# Patient Record
Sex: FEMALE | Race: WHITE | NOT HISPANIC OR LATINO | Employment: UNEMPLOYED | URBAN - METROPOLITAN AREA
[De-identification: names, ages, dates, MRNs, and addresses within clinical notes are randomized per-mention and may not be internally consistent; named-entity substitution may affect disease eponyms.]

---

## 2017-05-15 ENCOUNTER — HOSPITAL ENCOUNTER (OUTPATIENT)
Dept: FAMILY MEDICINE CLINIC | Facility: CLINIC | Age: 32
Setting detail: SPECIMEN
Discharge: HOME | End: 2017-05-15
Attending: NURSE PRACTITIONER | Admitting: NURSE PRACTITIONER

## 2017-05-15 LAB
ANION GAP SERPL CALC-SCNC: 14.2 MMOL/L (ref 10–20)
BUN SERPL-MCNC: 8 MG/DL (ref 8–20)
BUN/CREAT SERPL: 10 (ref 5.4–26.2)
CALCIUM SERPL-MCNC: 9.4 MG/DL (ref 8.9–10.3)
CHLORIDE SERPL-SCNC: 99 MMOL/L (ref 101–111)
CHOLEST SERPL-MCNC: 166 MG/DL
CHOLEST/HDLC SERPL: 3.3 {RATIO}
CONV CO2: 28 MMOL/L (ref 22–32)
CONV LDL CHOLESTEROL DIRECT: 98 MG/DL (ref 0–100)
CREAT UR-MCNC: 0.8 MG/DL (ref 0.4–1)
GLUCOSE SERPL-MCNC: 99 MG/DL (ref 65–99)
HDLC SERPL-MCNC: 51 MG/DL
LDLC/HDLC SERPL: 1.9 {RATIO}
LIPID INTERPRETATION: NORMAL
POTASSIUM SERPL-SCNC: 4.2 MMOL/L (ref 3.6–5.1)
SODIUM SERPL-SCNC: 137 MMOL/L (ref 136–144)
TRIGL SERPL-MCNC: 45 MG/DL
VLDLC SERPL CALC-MCNC: 17.2 MG/DL

## 2019-06-17 ENCOUNTER — TELEPHONE (OUTPATIENT)
Dept: PSYCHIATRY | Facility: CLINIC | Age: 34
End: 2019-06-17

## 2019-07-09 ENCOUNTER — OFFICE VISIT (OUTPATIENT)
Dept: PSYCHIATRY | Facility: CLINIC | Age: 34
End: 2019-07-09

## 2019-07-09 DIAGNOSIS — F98.8 ATTENTION DEFICIT DISORDER (ADD) WITHOUT HYPERACTIVITY: ICD-10-CM

## 2019-07-09 DIAGNOSIS — F41.1 GENERALIZED ANXIETY DISORDER: Primary | ICD-10-CM

## 2019-07-09 PROBLEM — F99 PSYCHIATRIC DISORDER: Status: ACTIVE | Noted: 2017-05-15

## 2019-07-09 PROBLEM — N89.8 VAGINAL DISCHARGE: Status: ACTIVE | Noted: 2017-06-27

## 2019-07-09 PROBLEM — E22.1 HYPERPROLACTINEMIA (HCC): Status: ACTIVE | Noted: 2017-05-15

## 2019-07-09 PROBLEM — Z13.9 ENCOUNTER FOR SCREENING: Status: ACTIVE | Noted: 2017-05-15

## 2019-07-09 PROCEDURE — 99213 OFFICE O/P EST LOW 20 MIN: CPT | Performed by: PSYCHIATRY & NEUROLOGY

## 2019-07-09 RX ORDER — DEXTROAMPHETAMINE SACCHARATE, AMPHETAMINE ASPARTATE, DEXTROAMPHETAMINE SULFATE AND AMPHETAMINE SULFATE 7.5; 7.5; 7.5; 7.5 MG/1; MG/1; MG/1; MG/1
TABLET ORAL
Qty: 75 TABLET | Refills: 0 | Status: SHIPPED | OUTPATIENT
Start: 2019-08-17 | End: 2019-07-18 | Stop reason: SDUPTHER

## 2019-07-09 RX ORDER — METRONIDAZOLE 500 MG/1
TABLET ORAL
COMMUNITY
Start: 2017-06-27 | End: 2019-10-09 | Stop reason: SDUPTHER

## 2019-07-09 RX ORDER — DEXTROAMPHETAMINE SACCHARATE, AMPHETAMINE ASPARTATE, DEXTROAMPHETAMINE SULFATE AND AMPHETAMINE SULFATE 7.5; 7.5; 7.5; 7.5 MG/1; MG/1; MG/1; MG/1
TABLET ORAL
Qty: 75 TABLET | Refills: 0 | Status: SHIPPED | OUTPATIENT
Start: 2019-07-18 | End: 2019-07-09 | Stop reason: SDUPTHER

## 2019-07-09 RX ORDER — DEXTROAMPHETAMINE SACCHARATE, AMPHETAMINE ASPARTATE, DEXTROAMPHETAMINE SULFATE AND AMPHETAMINE SULFATE 5; 5; 5; 5 MG/1; MG/1; MG/1; MG/1
1 TABLET ORAL 3 TIMES DAILY
Refills: 0 | COMMUNITY
Start: 2019-04-01 | End: 2019-07-09

## 2019-07-09 NOTE — PROGRESS NOTES
Subjective   Marlene Granda is a 33 y.o. female who presents today for follow up    Chief Complaint:  Decreased concentration     History of Present Illness: long hx of adhd   mood is stable overall, concentration is good on meds   she is able to accompish more , better self esteem, more organied, does not get distracted but it is getting less potent and does nto last long   the pt is in nursing school , requires memorization   denied AVH/SI/Hi  denied feeling anxious without a reason     Precipitating and Ameliorating Factors: the pt is in school       Previous Psychiatric Diagnoses   Axis I: Affective/Bipolar Disorder, Anxiety/Panic Disorder  Axis II: Borderline Personality     Past Hospitalizations or Residential Treatment   Locations\Providers: no SAs      Past Outpatient Treatment   Diagnosis Treated: Affective Disorder, Anxiety/Panic Dis.  Treatment Type: Medication Management  Location: [facility] in the past      Prior Psychiatric Medications   Comments: prozac for 6 years   wellbutrin for 4 years , stopped in Jan 2017  paxil not effective and weight gain   Buspar - not effective   strattera - not effective      Consequences of Mental Disorder   Consequences: emotional distress     SOCIAL HISTORY   Single  Number of children: 1  Current Relationship is: unstable  Family of Origin is: unstable, abusive, distant  Comments: Single  children-1  occupation-/- Student Neuro      Current Living Situation   Lives with: children     Education   Level: some college     Employment   Job Status: student     Hobbies and Leisure Activities   Activity Type: quiet activities     Alcohol use   Freq. drinking: social  Smoking History   Smoking Hx: Former smoker     Exercise   Exercise sessions (per wk): 4     Illicit Drug Use   Illicit Drugs used: none     FAMILY HISTORY OF MENTAL DISORDERS   fh Grandparents: Non-contributory  fh Mother: Affective Disorders  fh Father: Non-contributory  fh Siblings:  Non-contributory  fh Other: Non-contributory     The following portions of the patient's history were reviewed and updated as appropriate: allergies, current medications, past family history, past medical history, past social history, past surgical history and problem list.            Interval History  No Change    Side Effects  Denied       Past Medical History:  Past Medical History:   Diagnosis Date   • ADD (attention deficit disorder)    • Anxiety    • Borderline personality disorder (CMS/HCC)    • Depression    • Non-artifactual high serum prolactin     Abstraction from [facility] Prolactin Levels   • Substance abuse (CMS/HCC)      Past Surgical History:  History reviewed. No pertinent surgical history.    Problem List:  Patient Active Problem List   Diagnosis   • Attention deficit disorder (ADD) without hyperactivity   • Encounter for screening   • Generalized anxiety disorder   • Hyperprolactinemia (CMS/HCC)   • Psychiatric disorder   • Vaginal discharge       Allergy:   No Known Allergies     Discontinued Medications:  Medications Discontinued During This Encounter   Medication Reason   • amphetamine-dextroamphetamine (ADDERALL) 20 MG tablet    • amphetamine-dextroamphetamine (ADDERALL) 30 MG tablet Reorder       Current Medications:   Current Outpatient Medications   Medication Sig Dispense Refill   • [START ON 8/17/2019] amphetamine-dextroamphetamine (ADDERALL) 30 MG tablet 1tab po at 8 AM and noon and 1/2 tab at 4 PM 75 tablet 0   • metroNIDAZOLE (FLAGYL) 500 MG tablet METRONIDAZOLE 500 MG TABS       No current facility-administered medications for this visit.          Review of Symptoms:    Psychiatric/Behavioral: Negative for agitation, behavioral problems, confusion, decreased concentration, dysphoric mood, hallucinations, self-injury, sleep disturbance and suicidal ideas. The patient is not nervous/anxious and is not hyperactive.        Physical Exam:   There were no vitals taken for this  visit.    Mental Status Exam:   General Appearance:   good hygiene, apperas stated age   Behavior: good eye contact, normal motor activity   Attitude/Cooperation:   cooperative and appropriately verbal, patient is pleasant on approach   Speech  : coherent thoughts-organized and easy to follow, normal rate, normal flow and prosody   Thought Processes:   goal directed and associations logical   Thought Content/Perceptions A: WNL   The patient also appears to have  no depersonalization or derealization   Thought Content/Perceptions B:   WNL   The patient also appears to have no obsessions or compulsions or phobias    Risk Assessment:     Suicidality:   not present   Homicidality:   not present   Affect:   labile   Mood:   depressed, anxious, fluctuates   Insight/Judgement:   intact insight and judgement   Cognitive Functioning and Sensorium:   oriented to person and place and time, memory WNL   Intellect:   estimated (based on interview)   Fund of Knowledge:   adequate   Significant Personality Traits:   deferred; longitudinal view needed      PHQ-9 Score:  PHQ-9 Score: 7      Current some day smoker 3-10 mintues spent counseling Has reduced [place] use    I advised Marlene of the risks of tobacco use.     Lab Results:   No visits with results within 3 Month(s) from this visit.   Latest known visit with results is:   Hospital Outpatient Visit on 05/15/2017   Component Date Value Ref Range Status   • Total Cholesterol 05/15/2017 166  <200 mg/dL Final   • Triglycerides 05/15/2017 45  <150 mg/dL Final   • HDL Cholesterol 05/15/2017 51  >39 mg/dL Final   • LDL Cholesterol  05/15/2017 98  0 - 100 mg/dL Final   • VLDL Cholesterol 05/15/2017 17.2  <21 mg/dL Final   • LDL/HDL Ratio 05/15/2017 1.9   Final   • Chol/HDL Ratio 05/15/2017 3.3   Final    (SEE BELOW)  The following guidelines have been recommended by the NCEP for -    • Lipid Interpretation 05/15/2017 Total Cholesterol, Total Triglycerides, LDL Cholesterol,and HDL  Cholesterol:    Final   • Lipid Interpretation 05/15/2017 TOTAL CHOLESTEROL                    HDL CHOLESTEROL  Desirable:              Final   • Lipid Interpretation 05/15/2017 <200 mg/dL     Low HDL:            <40 mg/dL  Borderline High:   200-239 mg/dL    Final   • Lipid Interpretation 05/15/2017    Normal:           40-60 mg/dL  High:           > or = 240 mg/dL       Final   • Lipid Interpretation 05/15/2017 Desirable:          >60 mg/dL  TOTAL TRIGLYCERIDE                   LDL   Final   • Lipid Interpretation 05/15/2017 CHOLESTEROL  Normal:               <150 mg/dL     Optimal:           <100 mg/dL   Final   • Lipid Interpretation 05/15/2017  Borderline High:   150-199 mg/dL     Low Risk:       100-129 mg/dL  High:        Final   • Lipid Interpretation 05/15/2017         200-499 mg/dL     Borderline High:130-159 mg/dL  Very High:      > or =   Final   • Lipid Interpretation 05/15/2017 500 mg/dL     High:           160-189 mg/dL                                       Final   • Lipid Interpretation 05/15/2017   Very High:   > or = 190 mg/dL  The following ratios of LDL to HDL and Total   Final   • Lipid Interpretation 05/15/2017 Cholesterol to HDL are for information only:  LDL/HDL RATIO                       Final   • Lipid Interpretation 05/15/2017    TOTAL CHOLESTEROL/HDL RATIO  Desirable:              <5           Low Risk:    Final   • Lipid Interpretation 05/15/2017      3.3-4.4   Optimal:        < or = 3.5           Average Risk:   4.4-7.1       Final   • Lipid Interpretation 05/15/2017                                    Medium Risk:    7.1-11                         Final   • Lipid Interpretation 05/15/2017                   High Risk:         >11      Final   • Sodium 05/15/2017 137  136 - 144 mmol/L Final   • Potassium 05/15/2017 4.2  3.6 - 5.1 mmol/L Final   • Chloride 05/15/2017 99* 101 - 111 mmol/L Final   • CO2 05/15/2017 28  22 - 32 mmol/L Final   • Glucose 05/15/2017 99  65 - 99 mg/dL Final   •  BUN 05/15/2017 8  8 - 20 mg/dL Final   • Creatinine 05/15/2017 0.8  0.4 - 1.0 mg/dl Final   • Anion Gap 05/15/2017 14.2  10 - 20 Final   • BUN/Creatinine Ratio 05/15/2017 10.0  5.4 - 26.2 Final   • GFR MDRD Non  05/15/2017 >60  >60 mL/min/1.73m2 Final   • GFR MDRD  05/15/2017 >60  >60 mL/min/1.73m2 Final   • Calcium 05/15/2017 9.4  8.9 - 10.3 mg/dL Final       Assessment/Plan   Problems Addressed this Visit        Other    Attention deficit disorder (ADD) without hyperactivity    Relevant Medications    amphetamine-dextroamphetamine (ADDERALL) 30 MG tablet (Start on 8/17/2019)    Generalized anxiety disorder - Primary    Relevant Medications    amphetamine-dextroamphetamine (ADDERALL) 30 MG tablet (Start on 8/17/2019)          Visit Diagnoses:    ICD-10-CM ICD-9-CM   1. Generalized anxiety disorder F41.1 300.02   2. Attention deficit disorder (ADD) without hyperactivity F98.8 314.00       TREATMENT PLAN/GOALS: Continue supportive psychotherapy efforts and medications as indicated. Treatment and medication options discussed during today's visit. Patient ackowledged and verbally consented to continue with current treatment plan and was educated on the importance of compliance with treatment and follow-up appointments.    MEDICATION ISSUES:  Cont adderall 30 mg , total 75 mg/day   Anxiety - basic CBT , thought - emotions connection discussed   INSPECT reviewed as expected  Discussed medication options and treatment plan of prescribed medication as well as the risks, benefits, and side effects including potential falls, possible impaired driving and metabolic adversities among others. Patient is agreeable to call the office with any worsening of symptoms or onset of side effects. Patient is agreeable to call 911 or go to the nearest ER should he/she begin having SI/HI. No medication side effects or related complaints today.     MEDS ORDERED DURING VISIT:  New Medications Ordered This  Visit   Medications   • amphetamine-dextroamphetamine (ADDERALL) 30 MG tablet     Sitab po at 8 AM and noon and 1/2 tab at 4 PM     Dispense:  75 tablet     Refill:  0       Return in about 3 months (around 10/9/2019).         This document has been electronically signed by Liliam Casarez MD  2019 2:22 PM

## 2019-07-09 NOTE — PATIENT INSTRUCTIONS
Generalized Anxiety Disorder, Adult  Generalized anxiety disorder (SHARDA) is a mental health disorder. People with this condition constantly worry about everyday events. Unlike normal anxiety, worry related to SHARDA is not triggered by a specific event. These worries also do not fade or get better with time. SHARDA interferes with life functions, including relationships, work, and school.  SHARDA can vary from mild to severe. People with severe SHARDA can have intense waves of anxiety with physical symptoms (panic attacks).  What are the causes?  The exact cause of SHARDA is not known.  What increases the risk?  This condition is more likely to develop in:  · Women.  · People who have a family history of anxiety disorders.  · People who are very shy.  · People who experience very stressful life events, such as the death of a loved one.  · People who have a very stressful family environment.    What are the signs or symptoms?  People with SHARDA often worry excessively about many things in their lives, such as their health and family. They may also be overly concerned about:  · Doing well at work.  · Being on time.  · Natural disasters.  · Friendships.    Physical symptoms of SHARDA include:  · Fatigue.  · Muscle tension or having muscle twitches.  · Trembling or feeling shaky.  · Being easily startled.  · Feeling like your heart is pounding or racing.  · Feeling out of breath or like you cannot take a deep breath.  · Having trouble falling asleep or staying asleep.  · Sweating.  · Nausea, diarrhea, or irritable bowel syndrome (IBS).  · Headaches.  · Trouble concentrating or remembering facts.  · Restlessness.  · Irritability.    How is this diagnosed?  Your health care provider can diagnose SHARDA based on your symptoms and medical history. You will also have a physical exam. The health care provider will ask specific questions about your symptoms, including how severe they are, when they started, and if they come and go. Your health care  provider may ask you about your use of alcohol or drugs, including prescription medicines. Your health care provider may refer you to a mental health specialist for further evaluation.  Your health care provider will do a thorough examination and may perform additional tests to rule out other possible causes of your symptoms.  To be diagnosed with SHARDA, a person must have anxiety that:  · Is out of his or her control.  · Affects several different aspects of his or her life, such as work and relationships.  · Causes distress that makes him or her unable to take part in normal activities.  · Includes at least three physical symptoms of SHARDA, such as restlessness, fatigue, trouble concentrating, irritability, muscle tension, or sleep problems.    Before your health care provider can confirm a diagnosis of SHARDA, these symptoms must be present more days than they are not, and they must last for six months or longer.  How is this treated?  The following therapies are usually used to treat SHARDA:  · Medicine. Antidepressant medicine is usually prescribed for long-term daily control. Antianxiety medicines may be added in severe cases, especially when panic attacks occur.  · Talk therapy (psychotherapy). Certain types of talk therapy can be helpful in treating SHARDA by providing support, education, and guidance. Options include:  ? Cognitive behavioral therapy (CBT). People learn coping skills and techniques to ease their anxiety. They learn to identify unrealistic or negative thoughts and behaviors and to replace them with positive ones.  ? Acceptance and commitment therapy (ACT). This treatment teaches people how to be mindful as a way to cope with unwanted thoughts and feelings.  ? Biofeedback. This process trains you to manage your body's response (physiological response) through breathing techniques and relaxation methods. You will work with a therapist while machines are used to monitor your physical symptoms.  · Stress  management techniques. These include yoga, meditation, and exercise.    A mental health specialist can help determine which treatment is best for you. Some people see improvement with one type of therapy. However, other people require a combination of therapies.  Follow these instructions at home:  · Take over-the-counter and prescription medicines only as told by your health care provider.  · Try to maintain a normal routine.  · Try to anticipate stressful situations and allow extra time to manage them.  · Practice any stress management or self-calming techniques as taught by your health care provider.  · Do not punish yourself for setbacks or for not making progress.  · Try to recognize your accomplishments, even if they are small.  · Keep all follow-up visits as told by your health care provider. This is important.  Contact a health care provider if:  · Your symptoms do not get better.  · Your symptoms get worse.  · You have signs of depression, such as:  ? A persistently sad, cranky, or irritable mood.  ? Loss of enjoyment in activities that used to bring you collins.  ? Change in weight or eating.  ? Changes in sleeping habits.  ? Avoiding friends or family members.  ? Loss of energy for normal tasks.  ? Feelings of guilt or worthlessness.  Get help right away if:  · You have serious thoughts about hurting yourself or others.  If you ever feel like you may hurt yourself or others, or have thoughts about taking your own life, get help right away. You can go to your nearest emergency department or call:  · Your local emergency services (911 in the [place]  · A suicide crisis helpline, such as the National Suicide Prevention Lifeline at 1-927.439.7716. This is open 24 hours a day.    Summary  · Generalized anxiety disorder (SHARDA) is a mental health disorder that involves worry that is not triggered by a specific event.  · People with SHARDA often worry excessively about many things in their lives, such as their health and  family.  · SHARDA may cause physical symptoms such as restlessness, trouble concentrating, sleep problems, frequent sweating, nausea, diarrhea, headaches, and trembling or muscle twitching.  · A mental health specialist can help determine which treatment is best for you. Some people see improvement with one type of therapy. However, other people require a combination of therapies.  This information is not intended to replace advice given to you by your health care provider. Make sure you discuss any questions you have with your health care provider.  Document Released: 04/14/2014 Document Revised: 11/07/2017 Document Reviewed: 11/07/2017  [employer] Interactive Patient Education © 2019 [employer]

## 2019-07-18 DIAGNOSIS — F98.8 ATTENTION DEFICIT DISORDER (ADD) WITHOUT HYPERACTIVITY: ICD-10-CM

## 2019-07-18 RX ORDER — DEXTROAMPHETAMINE SACCHARATE, AMPHETAMINE ASPARTATE, DEXTROAMPHETAMINE SULFATE AND AMPHETAMINE SULFATE 7.5; 7.5; 7.5; 7.5 MG/1; MG/1; MG/1; MG/1
TABLET ORAL
Qty: 75 TABLET | Refills: 0 | Status: SHIPPED | OUTPATIENT
Start: 2019-07-17 | End: 2019-10-09 | Stop reason: SDUPTHER

## 2019-10-09 ENCOUNTER — OFFICE VISIT (OUTPATIENT)
Dept: PSYCHIATRY | Facility: CLINIC | Age: 34
End: 2019-10-09

## 2019-10-09 DIAGNOSIS — F98.8 ATTENTION DEFICIT DISORDER (ADD) WITHOUT HYPERACTIVITY: Primary | ICD-10-CM

## 2019-10-09 DIAGNOSIS — F41.1 GENERALIZED ANXIETY DISORDER: ICD-10-CM

## 2019-10-09 DIAGNOSIS — F17.200 TOBACCO DEPENDENCE: ICD-10-CM

## 2019-10-09 PROCEDURE — 99213 OFFICE O/P EST LOW 20 MIN: CPT | Performed by: PSYCHIATRY & NEUROLOGY

## 2019-10-09 RX ORDER — DEXTROAMPHETAMINE SACCHARATE, AMPHETAMINE ASPARTATE, DEXTROAMPHETAMINE SULFATE AND AMPHETAMINE SULFATE 7.5; 7.5; 7.5; 7.5 MG/1; MG/1; MG/1; MG/1
TABLET ORAL
Qty: 75 TABLET | Refills: 0 | Status: SHIPPED | OUTPATIENT
Start: 2019-10-09 | End: 2019-10-09 | Stop reason: SDUPTHER

## 2019-10-09 RX ORDER — DIPHENOXYLATE HYDROCHLORIDE AND ATROPINE SULFATE 2.5; .025 MG/1; MG/1
1 TABLET ORAL DAILY
Qty: 30 TABLET | Refills: 2 | Status: SHIPPED | OUTPATIENT
Start: 2019-10-09 | End: 2020-10-18 | Stop reason: SDUPTHER

## 2019-10-09 RX ORDER — METRONIDAZOLE 500 MG/1
500 TABLET ORAL 2 TIMES DAILY
Qty: 14 TABLET | Refills: 0 | Status: SHIPPED | OUTPATIENT
Start: 2019-10-09

## 2019-10-09 RX ORDER — NICOTINE 21 MG/24HR
1 PATCH, TRANSDERMAL 24 HOURS TRANSDERMAL EVERY 24 HOURS
Qty: 30 EACH | Refills: 1 | Status: SHIPPED | OUTPATIENT
Start: 2019-10-09

## 2019-10-09 RX ORDER — DEXTROAMPHETAMINE SACCHARATE, AMPHETAMINE ASPARTATE, DEXTROAMPHETAMINE SULFATE AND AMPHETAMINE SULFATE 7.5; 7.5; 7.5; 7.5 MG/1; MG/1; MG/1; MG/1
TABLET ORAL
Qty: 75 TABLET | Refills: 0 | Status: SHIPPED | OUTPATIENT
Start: 2019-10-09 | End: 2019-12-05 | Stop reason: SDUPTHER

## 2019-10-09 NOTE — PROGRESS NOTES
Subjective   Marlene Granda is a 34 y.o. 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  Treatment Type: Medication Management  Location: [facility] in the past      Prior Psychiatric Medications   Comments: prozac for 6 years   wellbutrin for 4 years , stopped in Jan 2017  paxil not effective and weight gain   Buspar - not effective   strattera - not effective      Consequences of Mental Disorder   Consequences: emotional distress     The following portions of the patient's history were reviewed and updated as appropriate: allergies, current medications, past family history, past medical history, past social history, past surgical history and problem list.              Interval History  No Change    Side Effects  Denied       Past Medical History:  Past Medical History:   Diagnosis Date   • ADD (attention deficit disorder)    • Anxiety    • Borderline personality disorder (CMS/HCC)    • Depression    • Non-artifactual high serum prolactin     Abstraction from [facility] Prolactin Levels   • Substance abuse (CMS/HCC)        Social History:  Social History     Socioeconomic History   • Marital status: Single      Spouse name: Not on file   • Number of children: Not on file   • Years of education: Not on file   • Highest education level: Not on file   Tobacco Use   • Smoking status: Heavy Tobacco Smoker     Packs/day: 0.50     Types: Cigarettes   • Smokeless tobacco: Never Used   Substance and Sexual Activity   • Alcohol use: No     Frequency: Never   • Drug use: No   • Sexual activity: Defer       Family History:  Family History   Problem Relation Age of Onset   • Other Brother         Other Medical Problems   • Crohn's disease Brother    • Cancer Maternal Grandmother         Breast Cancer   • Diabetes Maternal Grandmother    • Stroke Maternal Grandfather        Past Surgical History:  History reviewed. No pertinent surgical history.    Problem List:  Patient Active Problem List   Diagnosis   • Attention deficit disorder (ADD) without hyperactivity   • Encounter for screening   • Generalized anxiety disorder   • Hyperprolactinemia (CMS/HCC)   • Psychiatric disorder   • Vaginal discharge   • Tobacco dependence       Allergy:   No Known Allergies     Discontinued Medications:  Medications Discontinued During This Encounter   Medication Reason   • metroNIDAZOLE (FLAGYL) 500 MG tablet Reorder   • amphetamine-dextroamphetamine (ADDERALL) 30 MG tablet Reorder   • amphetamine-dextroamphetamine (ADDERALL) 30 MG tablet Reorder       Current Medications:   Current Outpatient Medications   Medication Sig Dispense Refill   • amphetamine-dextroamphetamine (ADDERALL) 30 MG tablet 1tab po at 8 AM and noon and 1/2 tab at 4 PM 75 tablet 0   • metroNIDAZOLE (FLAGYL) 500 MG tablet Take 1 tablet by mouth 2 (Two) Times a Day. 14 tablet 0   • multivitamin (THERAGRAN) tablet tablet Take 1 tablet by mouth Daily. 30 tablet 2   • nicotine (NICODERM CQ) 14 MG/24HR patch Place 1 patch on the skin as directed by provider Daily. 30 each 1     No current facility-administered medications for this visit.          Review of Symptoms:     Psychiatric/Behavioral: Negative for agitation, behavioral problems, confusion, decreased concentration, dysphoric mood, hallucinations, self-injury, sleep disturbance and suicidal ideas. The patient is not nervous/anxious and is not hyperactive.        Physical Exam:   There were no vitals taken for this visit.    Mental Status Exam:   Hygiene:   good  Cooperation:  Cooperative  Eye Contact:  Good  Psychomotor Behavior:  Appropriate  Affect:  Blunted  Mood: fluctates  Hopelessness: Denies  Speech:  Normal  Thought Process:  Goal directed and Linear  Thought Content:  Normal  Suicidal:  None  Homicidal:  None  Hallucinations:  None  Delusion:  None  Memory:  Intact  Orientation:  Person, Place, Time and Situation  Reliability:  good  Insight:  Fair  Judgement:  Fair  Impulse Control:  Fair  Physical/Medical Issues:  No        PHQ-9 Depression Screening  Little interest or pleasure in doing things? 2   Feeling down, depressed, or hopeless? 2   Trouble falling or staying asleep, or sleeping too much? 1   Feeling tired or having little energy? 2   Poor appetite or overeating? 0   Feeling bad about yourself - or that you are a failure or have let yourself or your family down? 2   Trouble concentrating on things, such as reading the newspaper or watching television? 3   Moving or speaking so slowly that other people could have noticed? Or the opposite - being so fidgety or restless that you have been moving around a lot more than usual? 0   Thoughts that you would be better off dead, or of hurting yourself in some way? 0   PHQ-9 Total Score 12   If you checked off any problems, how difficult have these problems made it for you to do your work, take care of things at home, or get along with other people? Very difficult           Current some day smoker 3-10 mintues spent counseling Agreeable to stop    I advised aMrlene of the risks of tobacco use.     Lab Results:   No visits with results within 3 Month(s) from this  visit.   Latest known visit with results is:   Hospital Outpatient Visit on 05/15/2017   Component Date Value Ref Range Status   • Total Cholesterol 05/15/2017 166  <200 mg/dL Final   • Triglycerides 05/15/2017 45  <150 mg/dL Final   • HDL Cholesterol 05/15/2017 51  >39 mg/dL Final   • LDL Cholesterol  05/15/2017 98  0 - 100 mg/dL Final   • VLDL Cholesterol 05/15/2017 17.2  <21 mg/dL Final   • LDL/HDL Ratio 05/15/2017 1.9   Final   • Chol/HDL Ratio 05/15/2017 3.3   Final    (SEE BELOW)  The following guidelines have been recommended by the NCEP for -    • Lipid Interpretation 05/15/2017 Total Cholesterol, Total Triglycerides, LDL Cholesterol,and HDL Cholesterol:    Final   • Lipid Interpretation 05/15/2017 TOTAL CHOLESTEROL                    HDL CHOLESTEROL  Desirable:              Final   • Lipid Interpretation 05/15/2017 <200 mg/dL     Low HDL:            <40 mg/dL  Borderline High:   200-239 mg/dL    Final   • Lipid Interpretation 05/15/2017    Normal:           40-60 mg/dL  High:           > or = 240 mg/dL       Final   • Lipid Interpretation 05/15/2017 Desirable:          >60 mg/dL  TOTAL TRIGLYCERIDE                   LDL   Final   • Lipid Interpretation 05/15/2017 CHOLESTEROL  Normal:               <150 mg/dL     Optimal:           <100 mg/dL   Final   • Lipid Interpretation 05/15/2017  Borderline High:   150-199 mg/dL     Low Risk:       100-129 mg/dL  High:        Final   • Lipid Interpretation 05/15/2017         200-499 mg/dL     Borderline High:130-159 mg/dL  Very High:      > or =   Final   • Lipid Interpretation 05/15/2017 500 mg/dL     High:           160-189 mg/dL                                       Final   • Lipid Interpretation 05/15/2017   Very High:   > or = 190 mg/dL  The following ratios of LDL to HDL and Total   Final   • Lipid Interpretation 05/15/2017 Cholesterol to HDL are for information only:  LDL/HDL RATIO                       Final   • Lipid Interpretation 05/15/2017    TOTAL  CHOLESTEROL/HDL RATIO  Desirable:              <5           Low Risk:    Final   • Lipid Interpretation 05/15/2017      3.3-4.4   Optimal:        < or = 3.5           Average Risk:   4.4-7.1       Final   • Lipid Interpretation 05/15/2017                                    Medium Risk:    7.1-11                         Final   • Lipid Interpretation 05/15/2017                   High Risk:         >11      Final   • Sodium 05/15/2017 137  136 - 144 mmol/L Final   • Potassium 05/15/2017 4.2  3.6 - 5.1 mmol/L Final   • Chloride 05/15/2017 99* 101 - 111 mmol/L Final   • CO2 05/15/2017 28  22 - 32 mmol/L Final   • Glucose 05/15/2017 99  65 - 99 mg/dL Final   • BUN 05/15/2017 8  8 - 20 mg/dL Final   • Creatinine 05/15/2017 0.8  0.4 - 1.0 mg/dl Final   • Anion Gap 05/15/2017 14.2  10 - 20 Final   • BUN/Creatinine Ratio 05/15/2017 10.0  5.4 - 26.2 Final   • GFR MDRD Non  05/15/2017 >60  >60 mL/min/1.73m2 Final   • GFR MDRD  05/15/2017 >60  >60 mL/min/1.73m2 Final   • Calcium 05/15/2017 9.4  8.9 - 10.3 mg/dL Final       Assessment/Plan   Problems Addressed this Visit        Other    Attention deficit disorder (ADD) without hyperactivity - Primary    Relevant Medications    amphetamine-dextroamphetamine (ADDERALL) 30 MG tablet    nicotine (NICODERM CQ) 14 MG/24HR patch    Generalized anxiety disorder    Relevant Medications    amphetamine-dextroamphetamine (ADDERALL) 30 MG tablet    nicotine (NICODERM CQ) 14 MG/24HR patch    Tobacco dependence    Relevant Medications    nicotine (NICODERM CQ) 14 MG/24HR patch          Visit Diagnoses:    ICD-10-CM ICD-9-CM   1. Attention deficit disorder (ADD) without hyperactivity F98.8 314.00   2. Generalized anxiety disorder F41.1 300.02   3. Tobacco dependence F17.200 305.1       TREATMENT PLAN/GOALS: Continue supportive psychotherapy efforts and medications as indicated. Treatment and medication options discussed during today's visit. Patient ackowledged  and verbally consented to continue with current treatment plan and was educated on the importance of compliance with treatment and follow-up appointments.    MEDICATION ISSUES:  Cont current meds  adderall   The pt wants to quit smoking, start nicoderm 14 mg    INSPECT reviewed as expected  Discussed medication options and treatment plan of prescribed medication as well as the risks, benefits, and side effects including potential falls, possible impaired driving and metabolic adversities among others. Patient is agreeable to call the office with any worsening of symptoms or onset of side effects. Patient is agreeable to call 911 or go to the nearest ER should he/she begin having SI/HI. No medication side effects or related complaints today.     MEDS ORDERED DURING VISIT:  New Medications Ordered This Visit   Medications   • metroNIDAZOLE (FLAGYL) 500 MG tablet     Sig: Take 1 tablet by mouth 2 (Two) Times a Day.     Dispense:  14 tablet     Refill:  0   • amphetamine-dextroamphetamine (ADDERALL) 30 MG tablet     Sitab po at 8 AM and noon and 1/2 tab at 4 PM     Dispense:  75 tablet     Refill:  0     Dispense on or after    • nicotine (NICODERM CQ) 14 MG/24HR patch     Sig: Place 1 patch on the skin as directed by provider Daily.     Dispense:  30 each     Refill:  1   • multivitamin (THERAGRAN) tablet tablet     Sig: Take 1 tablet by mouth Daily.     Dispense:  30 tablet     Refill:  2       Return in about 3 months (around 2020).         This document has been electronically signed by Liliam Casarez MD  2019 2:28 PM

## 2019-10-09 NOTE — PATIENT INSTRUCTIONS
Living With Anxiety    After being diagnosed with an anxiety disorder, you may be relieved to know why you have felt or behaved a certain way. It is natural to also feel overwhelmed about the treatment ahead and what it will mean for your life. With care and support, you can manage this condition and recover from it.  How to cope with anxiety  Dealing with stress  Stress is your body’s reaction to life changes and events, both good and bad. Stress can last just a few hours or it can be ongoing. Stress can play a major role in anxiety, so it is important to learn both how to cope with stress and how to think about it differently.  Talk with your health care provider or a counselor to learn more about stress reduction. He or she may suggest some stress reduction techniques, such as:  · Music therapy. This can include creating or listening to music that you enjoy and that inspires you.  · Mindfulness-based meditation. This involves being aware of your normal breaths, rather than trying to control your breathing. It can be done while sitting or walking.  · Centering prayer. This is a kind of meditation that involves focusing on a word, phrase, or sacred image that is meaningful to you and that brings you peace.  · Deep breathing. To do this, expand your stomach and inhale slowly through your nose. Hold your breath for 3-5 seconds. Then exhale slowly, allowing your stomach muscles to relax.  · Self-talk. This is a skill where you identify thought patterns that lead to anxiety reactions and correct those thoughts.  · Muscle relaxation. This involves tensing muscles then relaxing them.  Choose a stress reduction technique that fits your lifestyle and personality. Stress reduction techniques take time and practice. Set aside 5-15 minutes a day to do them. Therapists can offer training in these techniques. The training may be covered by some insurance plans. Other things you can do to manage stress include:  · Keeping a  stress diary. This can help you learn what triggers your stress and ways to control your response.  · Thinking about how you respond to certain situations. You may not be able to control everything, but you can control your reaction.  · Making time for activities that help you relax, and not feeling guilty about spending your time in this way.  Therapy combined with coping and stress-reduction skills provides the best chance for successful treatment.  Medicines  Medicines can help ease symptoms. Medicines for anxiety include:  · Anti-anxiety drugs.  · Antidepressants.  · Beta-blockers.  Medicines may be used as the main treatment for anxiety disorder, along with therapy, or if other treatments are not working. Medicines should be prescribed by a health care provider.  Relationships  Relationships can play a big part in helping you recover. Try to spend more time connecting with trusted friends and family members. Consider going to couples counseling, taking family education classes, or going to family therapy. Therapy can help you and others better understand the condition.  How to recognize changes in your condition  Everyone has a different response to treatment for anxiety. Recovery from anxiety happens when symptoms decrease and stop interfering with your daily activities at home or work. This may mean that you will start to:  · Have better concentration and focus.  · Sleep better.  · Be less irritable.  · Have more energy.  · Have improved memory.  It is important to recognize when your condition is getting worse. Contact your health care provider if your symptoms interfere with home or work and you do not feel like your condition is improving.  Where to find help and support:  You can get help and support from these sources:  · Self-help groups.  · Online and community organizations.  · A trusted spiritual leader.  · Couples counseling.  · Family education classes.  · Family therapy.  Follow these instructions  at home:  · Eat a healthy diet that includes plenty of vegetables, fruits, whole grains, low-fat dairy products, and lean protein. Do not eat a lot of foods that are high in solid fats, added sugars, or salt.  · Exercise. Most adults should do the following:  ? Exercise for at least 150 minutes each week. The exercise should increase your heart rate and make you sweat (moderate-intensity exercise).  ? Strengthening exercises at least twice a week.  · Cut down on caffeine, tobacco, alcohol, and other potentially harmful substances.  · Get the right amount and quality of sleep. Most adults need 7-9 hours of sleep each night.  · Make choices that simplify your life.  · Take over-the-counter and prescription medicines only as told by your health care provider.  · Avoid caffeine, alcohol, and certain over-the-counter cold medicines. These may make you feel worse. Ask your pharmacist which medicines to avoid.  · Keep all follow-up visits as told by your health care provider. This is important.  Questions to ask your health care provider  · Would I benefit from therapy?  · How often should I follow up with a health care provider?  · How long do I need to take medicine?  · Are there any long-term side effects of my medicine?  · Are there any alternatives to taking medicine?  Contact a health care provider if:  · You have a hard time staying focused or finishing daily tasks.  · You spend many hours a day feeling worried about everyday life.  · You become exhausted by worry.  · You start to have headaches, feel tense, or have nausea.  · You urinate more than normal.  · You have diarrhea.  Get help right away if:  · You have a racing heart and shortness of breath.  · You have thoughts of hurting yourself or others.  If you ever feel like you may hurt yourself or others, or have thoughts about taking your own life, get help right away. You can go to your nearest emergency department or call:  · Your local emergency services  (911 in the [place]  · A suicide crisis helpline, such as the National Suicide Prevention Lifeline at 1-951.379.8679. This is open 24-hours a day.  Summary  · Taking steps to deal with stress can help calm you.  · Medicines cannot cure anxiety disorders, but they can help ease symptoms.  · Family, friends, and partners can play a big part in helping you recover from an anxiety disorder.  This information is not intended to replace advice given to you by your health care provider. Make sure you discuss any questions you have with your health care provider.  Document Released: 12/12/2017 Document Revised: 12/12/2017 Document Reviewed: 12/12/2017  [employer] Patient Education © 2019 [employer]

## 2019-11-19 ENCOUNTER — TELEPHONE (OUTPATIENT)
Dept: PSYCHIATRY | Facility: CLINIC | Age: 34
End: 2019-11-19

## 2019-11-20 RX ORDER — FLUOXETINE HYDROCHLORIDE 20 MG/1
20 CAPSULE ORAL DAILY
Qty: 30 CAPSULE | Refills: 2 | Status: SHIPPED | OUTPATIENT
Start: 2019-11-20 | End: 2020-02-23

## 2019-12-05 DIAGNOSIS — F98.8 ATTENTION DEFICIT DISORDER (ADD) WITHOUT HYPERACTIVITY: ICD-10-CM

## 2019-12-05 RX ORDER — DEXTROAMPHETAMINE SACCHARATE, AMPHETAMINE ASPARTATE, DEXTROAMPHETAMINE SULFATE AND AMPHETAMINE SULFATE 7.5; 7.5; 7.5; 7.5 MG/1; MG/1; MG/1; MG/1
TABLET ORAL
Qty: 75 TABLET | Refills: 0 | Status: SHIPPED | OUTPATIENT
Start: 2019-12-05 | End: 2019-12-30 | Stop reason: SDUPTHER

## 2019-12-05 NOTE — TELEPHONE ENCOUNTER
Patient called to req refill of the Adderall 30 mg tablet  Last visit 10/09/2019  Next visit 01/15/2020

## 2019-12-30 DIAGNOSIS — F98.8 ATTENTION DEFICIT DISORDER (ADD) WITHOUT HYPERACTIVITY: ICD-10-CM

## 2019-12-30 NOTE — TELEPHONE ENCOUNTER
Patient is requesting refill on adderall 30 mg and also would like to just [facility]   good balance

## 2019-12-31 RX ORDER — DEXTROAMPHETAMINE SACCHARATE, AMPHETAMINE ASPARTATE, DEXTROAMPHETAMINE SULFATE AND AMPHETAMINE SULFATE 7.5; 7.5; 7.5; 7.5 MG/1; MG/1; MG/1; MG/1
TABLET ORAL
Qty: 75 TABLET | Refills: 0 | Status: SHIPPED | OUTPATIENT
Start: 2019-12-31 | End: 2020-08-05

## 2020-02-23 RX ORDER — FLUOXETINE HYDROCHLORIDE 20 MG/1
CAPSULE ORAL
Qty: 30 CAPSULE | Refills: 1 | Status: SHIPPED | OUTPATIENT
Start: 2020-02-23 | End: 2020-03-26 | Stop reason: SDUPTHER

## 2020-03-25 ENCOUNTER — TELEPHONE (OUTPATIENT)
Dept: PSYCHIATRY | Facility: CLINIC | Age: 35
End: 2020-03-25

## 2020-03-26 RX ORDER — FLUOXETINE HYDROCHLORIDE 40 MG/1
40 CAPSULE ORAL DAILY
Qty: 30 CAPSULE | Refills: 1 | Status: SHIPPED | OUTPATIENT
Start: 2020-03-26 | End: 2020-05-16

## 2020-04-19 RX ORDER — FLUOXETINE HYDROCHLORIDE 20 MG/1
CAPSULE ORAL
Qty: 30 CAPSULE | Refills: 1 | Status: SHIPPED | OUTPATIENT
Start: 2020-04-19 | End: 2020-06-18

## 2020-05-16 RX ORDER — FLUOXETINE HYDROCHLORIDE 40 MG/1
CAPSULE ORAL
Qty: 30 CAPSULE | Refills: 1 | Status: SHIPPED | OUTPATIENT
Start: 2020-05-16 | End: 2020-07-21

## 2020-06-18 RX ORDER — FLUOXETINE HYDROCHLORIDE 20 MG/1
CAPSULE ORAL
Qty: 30 CAPSULE | Refills: 1 | Status: SHIPPED | OUTPATIENT
Start: 2020-06-18 | End: 2020-08-05 | Stop reason: SDUPTHER

## 2020-07-21 RX ORDER — FLUOXETINE HYDROCHLORIDE 40 MG/1
CAPSULE ORAL
Qty: 30 CAPSULE | Refills: 1 | Status: SHIPPED | OUTPATIENT
Start: 2020-07-21 | End: 2020-08-05 | Stop reason: SDUPTHER

## 2020-08-05 ENCOUNTER — OFFICE VISIT (OUTPATIENT)
Dept: PSYCHIATRY | Facility: CLINIC | Age: 35
End: 2020-08-05

## 2020-08-05 DIAGNOSIS — F33.1 MAJOR DEPRESSIVE DISORDER, RECURRENT EPISODE, MODERATE (HCC): Primary | ICD-10-CM

## 2020-08-05 DIAGNOSIS — F98.8 ATTENTION DEFICIT DISORDER (ADD) WITHOUT HYPERACTIVITY: ICD-10-CM

## 2020-08-05 DIAGNOSIS — F41.1 GENERALIZED ANXIETY DISORDER: ICD-10-CM

## 2020-08-05 PROCEDURE — 99214 OFFICE O/P EST MOD 30 MIN: CPT | Performed by: PSYCHIATRY & NEUROLOGY

## 2020-08-05 RX ORDER — ARIPIPRAZOLE 2 MG/1
2 TABLET ORAL DAILY
Qty: 30 TABLET | Refills: 2 | Status: SHIPPED | OUTPATIENT
Start: 2020-08-05 | End: 2020-08-18 | Stop reason: SDUPTHER

## 2020-08-05 RX ORDER — FLUOXETINE HYDROCHLORIDE 20 MG/1
20 CAPSULE ORAL DAILY
Qty: 30 CAPSULE | Refills: 5 | Status: SHIPPED | OUTPATIENT
Start: 2020-08-05 | End: 2021-03-15 | Stop reason: SDUPTHER

## 2020-08-05 RX ORDER — FLUOXETINE HYDROCHLORIDE 40 MG/1
40 CAPSULE ORAL DAILY
Qty: 30 CAPSULE | Refills: 5 | Status: SHIPPED | OUTPATIENT
Start: 2020-08-05

## 2020-08-05 NOTE — PROGRESS NOTES
Subjective   Marlene Granda is a 34 y.o. female who presents today for follow up via phone  You have chosen to receive care through a telephone visit. Do you consent to use a telephone visit for your medical care today? Yes    Chief Complaint:  Decreased concentration  , depression     History of Present Illness:   The pt suffers from severe anxiety and adhd for many years   mood is stable overall, concentration is better on meds  she is able to accompish more ,does not get distracted   the pt finished  nursing school , but  Works in the pharmacy   She is off adderall since Jan 2020   Depression is slightly worse, hard time to be able to enjoy, rated as 7/10, almost every day, all day long, associated with decreased E , poor motivations      denied AVH/SI/Hi  Anxiety is low grade but persistent , mainly related to the situation      Precipitating and Ameliorating Factors: grand mother passed away 2 weeks ago          PAST PSYCHIATRIC HISTORY      Previous Psychiatric Diagnoses   Axis I: Affective/Bipolar Disorder, Anxiety/Panic Disorder  Axis II: Borderline Personality     Past Hospitalizations or Residential Treatment   [facility]ations\Providers[facility]      Past Outpatient Treatment   Diagnosis Treated: Affective Disorder, Anxiety/Panic Dis.  Treatment Type: Medication Management  Location: [facility] in the past      Prior Psychiatric Medications   Comments: prozac for 6 years   wellbutrin for 4 years , stopped in Jan 2017  paxil not effective and weight gain   Buspar - not effective   strattera - not effective   adderall - good    Consequences of Mental Disorder   Consequences: emotional distress     The following portions of the patient's history were reviewed and updated as appropriate: allergies, current medications, past family history, past medical history, past social history, past surgical history and problem list.              Interval History  Deteriorated - more depressed     Side Effects  Denied       Past  Medical History:  Past Medical History:   Diagnosis Date   • ADD (attention deficit disorder)    • Anxiety    • Borderline personality disorder (CMS/HCC)    • Depression    • Non-artifactual high serum prolactin     Abstraction from [facility] Prolactin Levels   • Substance abuse (CMS/HCC)        Social History:  Social History     Socioeconomic History   • Marital status: Single     Spouse name: Not on file   • Number of children: Not on file   • Years of education: Not on file   • Highest education level: Not on file   Tobacco Use   • Smoking status: Heavy Tobacco Smoker     Packs/day: 0.50     Types: Cigarettes   • Smokeless tobacco: Never Used   Substance and Sexual Activity   • Alcohol use: No     Frequency: Never   • Drug use: No   • Sexual activity: Defer       Family History:  Family History   Problem Relation Age of Onset   • Other Brother         Other Medical Problems   • Crohn's disease Brother    • Cancer Maternal Grandmother         Breast Cancer   • Diabetes Maternal Grandmother    • Stroke Maternal Grandfather        Past Surgical History:  History reviewed. No pertinent surgical history.    Problem List:  Patient Active Problem List   Diagnosis   • Attention deficit disorder (ADD) without hyperactivity   • Encounter for screening   • Generalized anxiety disorder   • Hyperprolactinemia (CMS/HCC)   • Psychiatric disorder   • Vaginal discharge   • Tobacco dependence   • Major depressive disorder, recurrent episode, moderate (CMS/HCC)       Allergy:   No Known Allergies     Discontinued Medications:  Medications Discontinued During This Encounter   Medication Reason   • amphetamine-dextroamphetamine (ADDERALL) 30 MG tablet *Therapy completed   • FLUoxetine (PROzac) 20 MG capsule Reorder   • FLUoxetine (PROzac) 40 MG capsule Reorder       Current Medications:   Current Outpatient Medications   Medication Sig Dispense Refill   • ARIPiprazole (ABILIFY) 2 MG tablet Take 1 tablet by mouth Daily. 30  tablet 2   • FLUoxetine (PROzac) 20 MG capsule Take 1 capsule by mouth Daily. 30 capsule 5   • FLUoxetine (PROzac) 40 MG capsule Take 1 capsule by mouth Daily. 30 capsule 5   • metroNIDAZOLE (FLAGYL) 500 MG tablet Take 1 tablet by mouth 2 (Two) Times a Day. 14 tablet 0   • multivitamin (THERAGRAN) tablet tablet Take 1 tablet by mouth Daily. 30 tablet 2   • nicotine (NICODERM CQ) 14 MG/24HR patch Place 1 patch on the skin as directed by provider Daily. 30 each 1     No current facility-administered medications for this visit.          Review of Symptoms:    Psychiatric/Behavioral: Negative for agitation, behavioral problems, confusion, decreased concentration, dysphoric mood, hallucinations, self-injury, sleep disturbance and suicidal ideas. The patient is slightly  nervous/anxious and is not hyperactive., more depressed         Physical Exam:   There were no vitals taken for this visit.    Mental Status Exam:   Hygiene:   unable to assess due to phone visit   Cooperation:  Cooperative  Eye Contact:  unable to assess due to phone visit   Psychomotor Behavior:  Appropriate  Affect:  congruent with mood   Mood: depressed  Hopelessness: Denies  Speech:  Normal  Thought Process:  Goal directed and Linear  Thought Content:  Normal  Suicidal:  None  Homicidal:  None  Hallucinations:  None  Delusion:  None  Memory:  Intact  Orientation:  Person, Place, Time and Situation  Reliability:  good  Insight:  Fair  Judgement:  Fair  Impulse Control:  Fair  Physical/Medical Issues:  No    MSE from 10/9/19 reviewed and accepted with changes     PHQ-9 Depression Screening  Little interest or pleasure in doing things? 2   Feeling down, depressed, or hopeless? 2   Trouble falling or staying asleep, or sleeping too much? 1   Feeling tired or having little energy? 2   Poor appetite or overeating? 0   Feeling bad about yourself - or that you are a failure or have let yourself or your family down? 1   Trouble concentrating on things, such  as reading the newspaper or watching television? 2   Moving or speaking so slowly that other people could have noticed? Or the opposite - being so fidgety or restless that you have been moving around a lot more than usual? 0   Thoughts that you would be better off dead, or of hurting yourself in some way? 0   PHQ-9 Total Score 10   If you checked off any problems, how difficult have these problems made it for you to do your work, take care of things at home, or get along with other people? Very difficult           Current some day smoker 3-10 mintues spent counseling Agreeable to stop    I advised Marlene of the risks of tobacco use.     Lab Results:   No visits with results within 3 Month(s) from this visit.   Latest known visit with results is:   No results found for any previous visit.       Assessment/Plan   Problems Addressed this Visit        Other    Attention deficit disorder (ADD) without hyperactivity    Relevant Medications    ARIPiprazole (ABILIFY) 2 MG tablet    FLUoxetine (PROzac) 20 MG capsule    FLUoxetine (PROzac) 40 MG capsule    Generalized anxiety disorder    Relevant Medications    ARIPiprazole (ABILIFY) 2 MG tablet    FLUoxetine (PROzac) 20 MG capsule    FLUoxetine (PROzac) 40 MG capsule    Major depressive disorder, recurrent episode, moderate (CMS/HCC) - Primary    Relevant Medications    ARIPiprazole (ABILIFY) 2 MG tablet    FLUoxetine (PROzac) 20 MG capsule    FLUoxetine (PROzac) 40 MG capsule          Visit Diagnoses:    ICD-10-CM ICD-9-CM   1. Major depressive disorder, recurrent episode, moderate (CMS/HCC) F33.1 296.32   2. Generalized anxiety disorder F41.1 300.02   3. Attention deficit disorder (ADD) without hyperactivity F98.8 314.00       TREATMENT PLAN/GOALS: Continue supportive psychotherapy efforts and medications as indicated. Treatment and medication options discussed during today's visit. Patient ackowledged and verbally consented to continue with current treatment plan and was  educated on the importance of compliance with treatment and follow-up appointments.    MEDICATION ISSUES:  Cont prozac 60 mg , d/c adderall   Add abilify  2 mg to augment prozac   Supportive therapy   INSPECT reviewed as expected adderall 1/4/2020 last refill   Discussed medication options and treatment plan of prescribed medication as well as the risks, benefits, and side effects including potential falls, possible impaired driving and metabolic adversities among others. Patient is agreeable to call the office with any worsening of symptoms or onset of side effects. Patient is agreeable to call 911 or go to the nearest ER should he/she begin having SI/HI. No medication side effects or related complaints today.     MEDS ORDERED DURING VISIT:  New Medications Ordered This Visit   Medications   • ARIPiprazole (ABILIFY) 2 MG tablet     Sig: Take 1 tablet by mouth Daily.     Dispense:  30 tablet     Refill:  2   • FLUoxetine (PROzac) 20 MG capsule     Sig: Take 1 capsule by mouth Daily.     Dispense:  30 capsule     Refill:  5   • FLUoxetine (PROzac) 40 MG capsule     Sig: Take 1 capsule by mouth Daily.     Dispense:  30 capsule     Refill:  5       Return in about 6 months (around 2/5/2021).       This visit has been rescheduled as a phone visit to comply with patient safety concerns in accordance with CDC recommendations. Total time of discussion was 15 minutes.    This document has been electronically signed by Liliam Casarez MD  August 5, 2020 09:59

## 2020-08-05 NOTE — PATIENT INSTRUCTIONS
Living With Depression  Everyone experiences occasional disappointment, sadness, and loss in their lives. When you are feeling down, blue, or sad for at least 2 weeks in a row, it may mean that you have depression. Depression can affect your thoughts and feelings, relationships, daily activities, and physical health. It is caused by changes in the way your brain functions. If you receive a diagnosis of depression, your health care provider will tell you which type of depression you have and what treatment options are available to you.  If you are living with depression, there are ways to help you recover from it and also ways to prevent it from coming back.  How to cope with lifestyle changes  Coping with stress         Stress is your body’s reaction to life changes and events, both good and bad. Stressful situations may include:  · Getting .  · The death of a spouse.  · Losing a job.  · Retiring.  · Having a baby.  Stress can last just a few hours or it can be ongoing. Stress can play a major role in depression, so it is important to learn both how to cope with stress and how to think about it differently.  Talk with your health care provider or a counselor if you would like to learn more about stress reduction. He or she may suggest some stress reduction techniques, such as:  · Music therapy. This can include creating music or listening to music. Choose music that you enjoy and that inspires you.  · Mindfulness-based meditation. This kind of meditation can be done while sitting or walking. It involves being aware of your normal breaths, rather than trying to control your breathing.  · Centering prayer. This is a kind of meditation that involves focusing on a spiritual word or phrase. Choose a word, phrase, or sacred image that is meaningful to you and that brings you peace.  · Deep breathing. To do this, expand your stomach and inhale slowly through your nose. Hold your breath for 3-5 seconds, then exhale  slowly, allowing your stomach muscles to relax.  · Muscle relaxation. This involves intentionally tensing muscles then relaxing them.  Choose a stress reduction technique that fits your lifestyle and personality. Stress reduction techniques take time and practice to develop. Set aside 5-15 minutes a day to do them. Therapists can offer training in these techniques. The training may be covered by some insurance plans. Other things you can do to manage stress include:  · Keeping a stress diary. This can help you learn what triggers your stress and ways to control your response.  · Understanding what your limits are and saying no to requests or events that lead to a schedule that is too full.  · Thinking about how you respond to certain situations. You may not be able to control everything, but you can control how you react.  · Adding humor to your life by watching funny films or TV shows.  · Making time for activities that help you relax and not feeling guilty about spending your time this way.    Medicines  Your health care provider may suggest certain medicines if he or she feels that they will help improve your condition. Avoid using alcohol and other substances that may prevent your medicines from working properly (may interact). It is also important to:  · Talk with your pharmacist or health care provider about all the medicines that you take, their possible side effects, and what medicines are safe to take together.  · Make it your goal to take part in all treatment decisions (shared decision-making). This includes giving input on the side effects of medicines. It is best if shared decision-making with your health care provider is part of your total treatment plan.  If your health care provider prescribes a medicine, you may not notice the full benefits of it for 4-8 weeks. Most people who are treated for depression need to be on medicine for at least 6-12 months after they feel better. If you are taking  medicines as part of your treatment, do not stop taking medicines without first talking to your health care provider. You may need to have the medicine slowly decreased (tapered) over time to decrease the risk of harmful side effects.  Relationships  Your health care provider may suggest family therapy along with individual therapy and drug therapy. While there may not be family problems that are causing you to feel depressed, it is still important to make sure your family learns as much as they can about your mental health. Having your family’s support can help make your treatment successful.  How to recognize changes in your condition  Everyone has a different response to treatment for depression. Recovery from major depression happens when you have not had signs of major depression for two months. This may mean that you will start to:  · Have more interest in doing activities.  · Feel less hopeless than you did 2 months ago.  · Have more energy.  · Overeat less often, or have better or improving appetite.  · Have better concentration.  Your health care provider will work with you to decide the next steps in your recovery. It is also important to recognize when your condition is getting worse. Watch for these signs:  · Having fatigue or low energy.  · Eating too much or too little.  · Sleeping too much or too little.  · Feeling restless, agitated, or hopeless.  · Having trouble concentrating or making decisions.  · Having unexplained physical complaints.  · Feeling irritable, angry, or aggressive.  Get help as soon as you or your family members notice these symptoms coming back.  How to get support and help from others  How to talk with friends and family members about your condition    Talking to friends and family members about your condition can provide you with one way to get support and guidance. Reach out to trusted friends or family members, explain your symptoms to them, and let them know that you are  working with a health care provider to treat your depression.  Financial resources  Not all insurance plans cover mental health care, so it is important to check with your insurance carrier. If paying for co-pays or counseling services is a problem, search for a local or [facility] mental health care center. They may be able to offer public mental health care services at low or no cost when you are not able to see a private health care provider.  If you are taking medicine for depression, you may be able to get the generic form, which may be less expensive. Some makers of prescription medicines also offer help to patients who cannot afford the medicines they need.  Follow these instructions at home:    · Get the right amount and quality of sleep.  · Cut down on using caffeine, tobacco, alcohol, and other potentially harmful substances.  · Try to exercise, such as walking or lifting small weights.  · Take over-the-counter and prescription medicines only as told by your health care provider.  · Eat a healthy diet that includes plenty of vegetables, fruits, whole grains, low-fat dairy products, and lean protein. Do not eat a lot of foods that are high in solid fats, added sugars, or salt.  · Keep all follow-up visits as told by your health care provider. This is important.  Contact a health care provider if:  · You stop taking your antidepressant medicines, and you have any of these symptoms:  ? Nausea.  ? Headache.  ? Feeling lightheaded.  ? Chills and body aches.  ? Not being able to sleep (insomnia).  · You or your friends and family think your depression is getting worse.  Get help right away if:  · You have thoughts of hurting yourself or others.  If you ever feel like you may hurt yourself or others, or have thoughts about taking your own life, get help right away. You can go to your nearest emergency department or call:  · Your local emergency services (911 in the [place]  · A suicide crisis helpline, such as the  National Suicide Prevention Lifeline at 1-585.790.6129. This is open 24-hours a day.  Summary  · If you are living with depression, there are ways to help you recover from it and also ways to prevent it from coming back.  · Work with your health care team to create a management plan that includes counseling, stress management techniques, and healthy lifestyle habits.  This information is not intended to replace advice given to you by your health care provider. Make sure you discuss any questions you have with your health care provider.  Document Released: 11/20/2017 Document Revised: 04/10/2020 Document Reviewed: 11/20/2017  [employer] Patient Education © 2020 [employer]

## 2020-08-18 DIAGNOSIS — F33.1 MAJOR DEPRESSIVE DISORDER, RECURRENT EPISODE, MODERATE (HCC): ICD-10-CM

## 2020-08-18 RX ORDER — ARIPIPRAZOLE 5 MG/1
5 TABLET ORAL DAILY
Qty: 30 TABLET | Refills: 1 | Status: SHIPPED | OUTPATIENT
Start: 2020-08-18 | End: 2020-10-14

## 2020-10-14 DIAGNOSIS — F33.1 MAJOR DEPRESSIVE DISORDER, RECURRENT EPISODE, MODERATE (HCC): ICD-10-CM

## 2020-10-14 RX ORDER — ARIPIPRAZOLE 5 MG/1
TABLET ORAL
Qty: 30 TABLET | Refills: 1 | Status: SHIPPED | OUTPATIENT
Start: 2020-10-14

## 2020-10-20 RX ORDER — DIPHENOXYLATE HYDROCHLORIDE AND ATROPINE SULFATE 2.5; .025 MG/1; MG/1
1 TABLET ORAL DAILY
Qty: 30 TABLET | Refills: 2 | Status: SHIPPED | OUTPATIENT
Start: 2020-10-20 | End: 2021-02-03

## 2020-11-10 DIAGNOSIS — F98.8 ATTENTION DEFICIT DISORDER (ADD) WITHOUT HYPERACTIVITY: Primary | ICD-10-CM

## 2020-11-10 RX ORDER — ATOMOXETINE 40 MG/1
CAPSULE ORAL
Qty: 60 CAPSULE | Refills: 2 | Status: SHIPPED | OUTPATIENT
Start: 2020-11-10

## 2021-02-03 RX ORDER — MULTIVITAMIN WITH FOLIC ACID 400 MCG
TABLET ORAL
Qty: 30 TABLET | Refills: 2 | Status: SHIPPED | OUTPATIENT
Start: 2021-02-03

## 2021-03-15 DIAGNOSIS — F41.1 GENERALIZED ANXIETY DISORDER: ICD-10-CM

## 2021-03-15 DIAGNOSIS — F33.1 MAJOR DEPRESSIVE DISORDER, RECURRENT EPISODE, MODERATE (HCC): ICD-10-CM

## 2021-03-16 RX ORDER — FLUOXETINE HYDROCHLORIDE 20 MG/1
20 CAPSULE ORAL DAILY
Qty: 30 CAPSULE | Refills: 1 | Status: SHIPPED | OUTPATIENT
Start: 2021-03-16

## 2021-04-22 ENCOUNTER — OUTSIDE FACILITY SERVICE (OUTPATIENT)
Dept: CARDIOLOGY | Facility: CLINIC | Age: 36
End: 2021-04-22

## 2021-04-22 PROCEDURE — 93306 TTE W/DOPPLER COMPLETE: CPT | Performed by: INTERNAL MEDICINE
